# Patient Record
Sex: FEMALE | Race: OTHER
[De-identification: names, ages, dates, MRNs, and addresses within clinical notes are randomized per-mention and may not be internally consistent; named-entity substitution may affect disease eponyms.]

---

## 2021-05-05 ENCOUNTER — HOSPITAL ENCOUNTER (EMERGENCY)
Dept: HOSPITAL 61 - ER | Age: 31
Discharge: HOME | End: 2021-05-05
Payer: COMMERCIAL

## 2021-05-05 VITALS — HEIGHT: 62 IN | BODY MASS INDEX: 49.9 KG/M2 | WEIGHT: 271.17 LBS

## 2021-05-05 VITALS — DIASTOLIC BLOOD PRESSURE: 90 MMHG | SYSTOLIC BLOOD PRESSURE: 126 MMHG

## 2021-05-05 DIAGNOSIS — H66.91: Primary | ICD-10-CM

## 2021-05-05 PROCEDURE — 99283 EMERGENCY DEPT VISIT LOW MDM: CPT

## 2021-05-05 NOTE — ED.ADGEN
Past Medical History


Past Medical History:  No Pertinent History


Past Surgical History:  Hysterectomy


Smoking Status:  Never Smoker


Alcohol Use:  Occasionally





General Adult


EDM:


Chief Complaint:  EARACHE/EAR PAIN





HPI:


HPI:


Patient is a 30-year-old female who presents to the emergency room complaining 

of right ear pain that started around 3 AM this morning.  Patient states that 

she has been having cold-like symptoms.  She just got over it some nasal 

congestion and mild cough a few days ago.  She has not had any fever.  She 

states that she has this severe right ear aching.  She never had anything like 

this previously.  She denies any fevers.  She denies any drainage from the ear. 

She denies any trauma.





Review of Systems:


Review of Systems:


Complete ROS is negative unless otherwise documented in HPI





Physical Exam:


PE:


General: Awake, alert, NAD. Well Nourished, well hydrated. Cooperative


HEENT: Atraumatic, EOMI, PERRL, airway patent, moist oral mucosa, Right TM: 

erythema, no fluid within canal


Neck: Supple, trachea midline


Respiratory: CTA bilaterally, normal effort, no wheezing/crackles


CV: RRR, no murmur, cap refill <2


GI: Soft, nondistended, nontender, no masses


MSK: No obvious deformities


Skin: Warm, dry, intact


Neuro: A&O x3, speech NL, sensory and motor grossly intact, no focal deficits


Psych: Normal affect, normal mood, not suicidal or homicidal





Current Patient Data:


Vital Signs:





                                   Vital Signs








  Date Time  Temp Pulse Resp B/P (MAP) Pulse Ox O2 Delivery O2 Flow Rate FiO2


 


5/5/21 08:25 98.4 86 16 126/90 (102) 98 Room Air  





 98.4       











EKG:


EKG:


[]





Heart Score:


C/O Chest Pain:  N/A


Risk Factors:


Risk Factors:  DM, Current or recent (<one month) smoker, HTN, HLP, family 

history of CAD, obesity.


Risk Scores:


Score 0 - 3:  2.5% MACE over next 6 weeks - Discharge Home


Score 4 - 6:  20.3% MACE over next 6 weeks - Admit for Clinical Observation


Score 7 - 10:  72.7% MACE over next 6 weeks - Early Invasive Strategies





Radiology/Procedures:


Radiology/Procedures:


[]





Course & Med Decision Making:


Course & Med Decision Making


Pertinent Labs and Imaging studies reviewed. (See chart for details)





Patient is a 30-year-old female who presents to the emergency room complaining 

of severe right ear pain.  Patient appears to have an otitis media.  We will put

 her on Ciprodex drops.  Eardrum is intact.  Patient's test results and vitals 

while in the ED were fully reviewed and discussed with the patient. Patient is 

stable and at this time does not need admission to the hospital. We have 

discussed strict return precautions and the importance of following up with 

their Primary Care Physician. Patient stated understanding and was given an 

opportunity to ask any questions. Patient is in agreement with plan.





Dragon Disclaimer:


Dragon Disclaimer:


This electronic medical record was generated, in whole or in part, using a voice

 recognition dictation system.





Departure


Departure


Impression:  


   Primary Impression:  


   Otitis media


Disposition:  01 HOME / SELF CARE / HOMELESS


Condition:  STABLE


Referrals:  


NO PCP (PCP)


Patient Instructions:  Otitis Media, Adult


Scripts


Ciprofloxacin HCl/Dexameth (Ciproflox-Dexameth Otic Susp) 7.5 Ml Drops.susp


4 DROP OT BID for 7 Days, DROP


   Prov: OLAYINKA ROGERS MD         5/5/21











OLAYINKA ROGERS MD             May 5, 2021 09:20